# Patient Record
Sex: MALE | Race: WHITE | NOT HISPANIC OR LATINO | Employment: OTHER | URBAN - METROPOLITAN AREA
[De-identification: names, ages, dates, MRNs, and addresses within clinical notes are randomized per-mention and may not be internally consistent; named-entity substitution may affect disease eponyms.]

---

## 2021-04-26 ENCOUNTER — LAB (OUTPATIENT)
Dept: LAB | Facility: HOSPITAL | Age: 56
End: 2021-04-26

## 2021-04-26 ENCOUNTER — OFFICE VISIT (OUTPATIENT)
Dept: FAMILY MEDICINE CLINIC | Facility: CLINIC | Age: 56
End: 2021-04-26

## 2021-04-26 VITALS
OXYGEN SATURATION: 96 % | BODY MASS INDEX: 30.92 KG/M2 | TEMPERATURE: 96.8 F | WEIGHT: 204 LBS | SYSTOLIC BLOOD PRESSURE: 148 MMHG | DIASTOLIC BLOOD PRESSURE: 83 MMHG | HEART RATE: 97 BPM | HEIGHT: 68 IN

## 2021-04-26 DIAGNOSIS — R56.9 SEIZURES (HCC): Chronic | ICD-10-CM

## 2021-04-26 DIAGNOSIS — Z13.9 ENCOUNTER FOR HEALTH-RELATED SCREENING: ICD-10-CM

## 2021-04-26 DIAGNOSIS — J45.20 MILD INTERMITTENT ASTHMA WITHOUT COMPLICATION: Chronic | ICD-10-CM

## 2021-04-26 DIAGNOSIS — T78.40XA ALLERGIC DISORDER, INITIAL ENCOUNTER: Primary | Chronic | ICD-10-CM

## 2021-04-26 LAB
ANION GAP SERPL CALCULATED.3IONS-SCNC: 8.3 MMOL/L (ref 5–15)
BUN SERPL-MCNC: 11 MG/DL (ref 6–20)
BUN/CREAT SERPL: 10.7 (ref 7–25)
CALCIUM SPEC-SCNC: 9 MG/DL (ref 8.6–10.5)
CHLORIDE SERPL-SCNC: 105 MMOL/L (ref 98–107)
CHOLEST SERPL-MCNC: 193 MG/DL (ref 0–200)
CO2 SERPL-SCNC: 26.7 MMOL/L (ref 22–29)
CREAT SERPL-MCNC: 1.03 MG/DL (ref 0.76–1.27)
GFR SERPL CREATININE-BSD FRML MDRD: 75 ML/MIN/1.73
GLUCOSE SERPL-MCNC: 74 MG/DL (ref 65–99)
HCV AB SER DONR QL: NORMAL
HDLC SERPL-MCNC: 49 MG/DL (ref 40–60)
LDLC SERPL CALC-MCNC: 124 MG/DL (ref 0–100)
LDLC/HDLC SERPL: 2.47 {RATIO}
POTASSIUM SERPL-SCNC: 3.8 MMOL/L (ref 3.5–5.2)
SODIUM SERPL-SCNC: 140 MMOL/L (ref 136–145)
TRIGL SERPL-MCNC: 114 MG/DL (ref 0–150)
VLDLC SERPL-MCNC: 20 MG/DL (ref 5–40)

## 2021-04-26 PROCEDURE — 86803 HEPATITIS C AB TEST: CPT

## 2021-04-26 PROCEDURE — 80048 BASIC METABOLIC PNL TOTAL CA: CPT

## 2021-04-26 PROCEDURE — 99203 OFFICE O/P NEW LOW 30 MIN: CPT | Performed by: NURSE PRACTITIONER

## 2021-04-26 PROCEDURE — 36415 COLL VENOUS BLD VENIPUNCTURE: CPT

## 2021-04-26 PROCEDURE — 80061 LIPID PANEL: CPT

## 2021-04-26 RX ORDER — KETOTIFEN FUMARATE 0.35 MG/ML
1 SOLUTION/ DROPS OPHTHALMIC 2 TIMES DAILY
COMMUNITY

## 2021-04-26 RX ORDER — ALBUTEROL SULFATE 90 UG/1
2 AEROSOL, METERED RESPIRATORY (INHALATION) EVERY 4 HOURS PRN
COMMUNITY
Start: 2021-04-02 | End: 2022-09-27

## 2021-04-26 RX ORDER — PHENYTOIN SODIUM 100 MG/1
300 CAPSULE, EXTENDED RELEASE ORAL NIGHTLY
COMMUNITY
Start: 2021-04-02

## 2021-04-26 NOTE — PROGRESS NOTES
"Subjective        Faisal Cobb is a 55 y.o. male.     Chief Complaint   Patient presents with   • Seizures     new pt establishment       History of Present Illness  Patient is new to this office. He is here for management of his chronic medical problems seizures, allergies.     Health screening: he is unsure of covid vaccine. He is eating healthy and taking immune medications. He is current with flu shot.   He said he exercises.       The following portions of the patient's history were reviewed and updated as appropriate: allergies, current medications, past family history, past medical history, past social history, past surgical history and problem list.      Current Outpatient Medications:   •  albuterol sulfate  (90 Base) MCG/ACT inhaler, Inhale 2 puffs Every 4 (Four) Hours As Needed., Disp: , Rfl:   •  ketotifen (ZADITOR) 0.025 % ophthalmic solution, 1 drop 2 (Two) Times a Day., Disp: , Rfl:   •  LORATADINE ALLERGY RELIEF PO, Take 10 mg by mouth., Disp: , Rfl:   •  phenytoin (DILANTIN) 100 MG ER capsule, Take 300 mg by mouth Every Night., Disp: , Rfl:     No results found for this or any previous visit (from the past 4032 hour(s)).      Review of Systems   HENT:        Last dental exam one month ago Dr JESUS Mckee   Eyes:        1/2021 last eye exam.   Not color blind has bifocals.       Objective     /83 (BP Location: Right arm, Patient Position: Sitting, Cuff Size: Adult)   Pulse 97   Temp 96.8 °F (36 °C) (Infrared)   Ht 172.7 cm (68\")   Wt 92.5 kg (204 lb)   SpO2 96%   BMI 31.02 kg/m²     Physical Exam  Vitals and nursing note reviewed.   Constitutional:       Appearance: Normal appearance.   HENT:      Head: Normocephalic.      Right Ear: External ear normal.      Left Ear: External ear normal.      Nose: Nose normal.      Mouth/Throat:      Mouth: Mucous membranes are moist.   Eyes:      Conjunctiva/sclera: Conjunctivae normal.      Pupils: Pupils are equal, round, and reactive to light. "   Cardiovascular:      Rate and Rhythm: Normal rate and regular rhythm.      Pulses: Normal pulses.      Heart sounds: Normal heart sounds.   Pulmonary:      Effort: Pulmonary effort is normal.      Breath sounds: Normal breath sounds.   Abdominal:      General: Abdomen is flat. Bowel sounds are normal.      Palpations: Abdomen is soft.   Musculoskeletal:         General: Normal range of motion.      Cervical back: Normal range of motion.   Skin:     General: Skin is warm and dry.      Capillary Refill: Capillary refill takes less than 2 seconds.   Neurological:      General: No focal deficit present.      Mental Status: He is alert and oriented to person, place, and time.   Psychiatric:         Mood and Affect: Mood is elated.         Speech: Speech normal.         Behavior: Behavior is hyperactive.         Thought Content: Thought content normal.         Cognition and Memory: Cognition and memory normal.         Judgment: Judgment normal.         Result Review :                Assessment/Plan    Diagnoses and all orders for this visit:    1. Allergic disorder, initial encounter (Primary)  Comments:  discussed his allergies and using bronchodilator.   Orders:  -     Full Pulmonary Function Test With Bronchodilator; Future    2. Mild intermittent asthma without complication  Comments:  he reports never had a pulmonary functions study.  Orders:  -     Full Pulmonary Function Test With Bronchodilator; Future    3. Encounter for health-related screening  Comments:  labs ordered. talked with him about covid vaccine he declines  Orders:  -     Lipid Panel; Future  -     Basic Metabolic Panel; Future  -     Hepatitis C antibody; Future    4. Seizures (CMS/HCC)  Comments:  stable      Patient Instructions   Follow up after testing.   Continue current meds.       Follow Up   Return in about 6 months (around 10/26/2021).    Patient was given instructions and counseling regarding his condition or for health maintenance  advice. Please see specific information pulled into the AVS if appropriate.     Snow Coelho, APRN    04/26/21

## 2021-07-21 ENCOUNTER — TELEPHONE (OUTPATIENT)
Dept: FAMILY MEDICINE CLINIC | Facility: CLINIC | Age: 56
End: 2021-07-21

## 2021-07-21 RX ORDER — LORATADINE 10 MG/1
10 TABLET ORAL DAILY
Qty: 90 TABLET | Refills: 1 | Status: SHIPPED | OUTPATIENT
Start: 2021-07-21 | End: 2022-11-24 | Stop reason: SDUPTHER

## 2021-07-21 NOTE — TELEPHONE ENCOUNTER
Patient needs refill on Loratadine. He states when he was here in April he discussed getting amoxicillan for his allergies if they did not improve. He would like this rx called in. Please advise.

## 2021-07-22 RX ORDER — MONTELUKAST SODIUM 10 MG/1
10 TABLET ORAL NIGHTLY
Qty: 90 TABLET | Refills: 1 | Status: SHIPPED | OUTPATIENT
Start: 2021-07-22 | End: 2022-01-21

## 2021-10-26 ENCOUNTER — OFFICE VISIT (OUTPATIENT)
Dept: FAMILY MEDICINE CLINIC | Facility: CLINIC | Age: 56
End: 2021-10-26

## 2021-10-26 VITALS
SYSTOLIC BLOOD PRESSURE: 127 MMHG | BODY MASS INDEX: 30.31 KG/M2 | WEIGHT: 200 LBS | HEART RATE: 94 BPM | TEMPERATURE: 96.6 F | OXYGEN SATURATION: 94 % | DIASTOLIC BLOOD PRESSURE: 88 MMHG | HEIGHT: 68 IN

## 2021-10-26 DIAGNOSIS — Z23 NEED FOR VACCINATION: ICD-10-CM

## 2021-10-26 DIAGNOSIS — J45.20 MILD INTERMITTENT ASTHMA WITHOUT COMPLICATION: Primary | ICD-10-CM

## 2021-10-26 DIAGNOSIS — R56.9 SEIZURES (HCC): Chronic | ICD-10-CM

## 2021-10-26 PROCEDURE — 90732 PPSV23 VACC 2 YRS+ SUBQ/IM: CPT | Performed by: NURSE PRACTITIONER

## 2021-10-26 PROCEDURE — 99213 OFFICE O/P EST LOW 20 MIN: CPT | Performed by: NURSE PRACTITIONER

## 2021-10-26 PROCEDURE — G0009 ADMIN PNEUMOCOCCAL VACCINE: HCPCS | Performed by: NURSE PRACTITIONER

## 2021-10-26 RX ORDER — MULTIPLE VITAMINS W/ MINERALS TAB 9MG-400MCG
1 TAB ORAL DAILY
COMMUNITY

## 2021-10-26 NOTE — PATIENT INSTRUCTIONS
Follow up 6 months.  Get flu shot in couple of days  Get us the date that you got the covid vaccine.

## 2021-10-26 NOTE — PROGRESS NOTES
Subjective        Faisal Cobb Jr. is a 56 y.o. male.     Chief Complaint   Patient presents with   • Asthma     6 month f/u   • Seizures       History of Present Illness  Patient is here for management of his chronic medical problems; asthma and seizures.    Asthma: he is using albuterol inhaler every day. Has trouble breathing in the morning. Coffee helps says never tested for asthma.  Taking singulair daily 10 mg once day. He has humidifier in living room and bathroom. He said he cleans filters daily.    Seizures: dilantin 300 mg nightly denies he has had any seizures he is followed by neurologist Dr Wilcox. He has been evaluated of recent.    Health screening: he declines covid vaccine, but then said he got the J&J shot in July but washed his card and does not have proof. He got the vaccine a clinic. He will have to find paper on it. He will get flu shot. Will get the pneumonia vaccine today.    The following portions of the patient's history were reviewed and updated as appropriate: allergies, current medications, past family history, past medical history, past social history, past surgical history and problem list.      Current Outpatient Medications:   •  albuterol sulfate  (90 Base) MCG/ACT inhaler, Inhale 2 puffs Every 4 (Four) Hours As Needed., Disp: , Rfl:   •  ketotifen (ZADITOR) 0.025 % ophthalmic solution, 1 drop 2 (Two) Times a Day., Disp: , Rfl:   •  loratadine (Claritin) 10 MG tablet, Take 1 tablet by mouth Daily., Disp: 90 tablet, Rfl: 1  •  montelukast (Singulair) 10 MG tablet, Take 1 tablet by mouth Every Night., Disp: 90 tablet, Rfl: 1  •  multivitamin with minerals (MULTIVITAMIN ADULT PO), Take 1 tablet by mouth Daily., Disp: , Rfl:   •  phenytoin (DILANTIN) 100 MG ER capsule, Take 300 mg by mouth Every Night., Disp: , Rfl:     Current Facility-Administered Medications:   •  pneumococcal polysaccharide 23-valent (PNEUMOVAX-23) vaccine 0.5 mL, 0.5 mL, Intramuscular, Once, Snow Coelho,  "APRN    No results found for this or any previous visit (from the past 4032 hour(s)).      Review of Systems   HENT:        Recent dental fracture followed by dentist has a cavity will see dentist in 11/2021   Eyes:        Wears glasses  Last eye exam 2021  Not blind or colorblind   Allergic/Immunologic: Negative.    Neurological: Negative for seizures.        Occassional headaches  No seizures   Hematological: Negative.    Psychiatric/Behavioral: Negative for dysphoric mood. The patient is not nervous/anxious.        Objective     /88 (BP Location: Left arm, Patient Position: Sitting, Cuff Size: Adult)   Pulse 94   Temp 96.6 °F (35.9 °C) (Infrared)   Ht 172.7 cm (68\")   Wt 90.7 kg (200 lb)   SpO2 94%   BMI 30.41 kg/m²     Physical Exam  Vitals and nursing note reviewed.   Constitutional:       Appearance: Normal appearance.   HENT:      Head: Normocephalic.      Right Ear: External ear normal.      Left Ear: External ear normal.      Nose: Nose normal.      Mouth/Throat:      Mouth: Mucous membranes are moist.   Eyes:      Pupils: Pupils are equal, round, and reactive to light.   Cardiovascular:      Rate and Rhythm: Normal rate and regular rhythm.      Pulses: Normal pulses.      Heart sounds: Normal heart sounds.   Pulmonary:      Effort: Pulmonary effort is normal.      Breath sounds: Normal breath sounds.   Abdominal:      General: Abdomen is flat. Bowel sounds are normal.      Palpations: Abdomen is soft.   Musculoskeletal:         General: Normal range of motion.      Cervical back: Normal range of motion.   Skin:     General: Skin is warm and dry.      Capillary Refill: Capillary refill takes less than 2 seconds.   Neurological:      General: No focal deficit present.      Mental Status: He is oriented to person, place, and time.   Psychiatric:         Mood and Affect: Mood normal.         Behavior: Behavior normal.         Thought Content: Thought content normal.         Result Review :        "         Assessment/Plan    Diagnoses and all orders for this visit:    1. Mild intermittent asthma without complication (Primary)  Comments:  stable. will give pneumonia vaccine today  Orders:  -     pneumococcal polysaccharide 23-valent (PNEUMOVAX-23) vaccine 0.5 mL    2. Seizures (HCC)  Comments:  stable      Patient Instructions   Follow up 6 months.  Get flu shot in couple of days  Get us the date that you got the covid vaccine.        Follow Up   Return in about 6 months (around 4/26/2022) for Medicare Wellness.    Patient was given instructions and counseling regarding his condition or for health maintenance advice. Please see specific information pulled into the AVS if appropriate.     Snow Coelho, APRN    10/26/21

## 2022-01-21 RX ORDER — MONTELUKAST SODIUM 10 MG/1
TABLET ORAL
Qty: 90 TABLET | Refills: 1 | Status: SHIPPED | OUTPATIENT
Start: 2022-01-21 | End: 2022-07-18

## 2022-05-17 ENCOUNTER — OFFICE VISIT (OUTPATIENT)
Dept: FAMILY MEDICINE CLINIC | Facility: CLINIC | Age: 57
End: 2022-05-17

## 2022-05-17 VITALS
HEART RATE: 76 BPM | TEMPERATURE: 96.6 F | WEIGHT: 200 LBS | BODY MASS INDEX: 30.31 KG/M2 | DIASTOLIC BLOOD PRESSURE: 87 MMHG | OXYGEN SATURATION: 94 % | HEIGHT: 68 IN | SYSTOLIC BLOOD PRESSURE: 134 MMHG

## 2022-05-17 DIAGNOSIS — J45.20 MILD INTERMITTENT ASTHMA WITHOUT COMPLICATION: Chronic | ICD-10-CM

## 2022-05-17 DIAGNOSIS — R56.9 SEIZURES: Chronic | ICD-10-CM

## 2022-05-17 DIAGNOSIS — Z00.00 INITIAL MEDICARE ANNUAL WELLNESS VISIT: Primary | ICD-10-CM

## 2022-05-17 DIAGNOSIS — F06.8 MEMORY DISORDER DUE TO ORGANIC BRAIN DAMAGE: Chronic | ICD-10-CM

## 2022-05-17 DIAGNOSIS — T78.40XS ALLERGIC DISORDER, SEQUELA: ICD-10-CM

## 2022-05-17 PROCEDURE — 96160 PT-FOCUSED HLTH RISK ASSMT: CPT | Performed by: NURSE PRACTITIONER

## 2022-05-17 PROCEDURE — 1159F MED LIST DOCD IN RCRD: CPT | Performed by: NURSE PRACTITIONER

## 2022-05-17 PROCEDURE — G0438 PPPS, INITIAL VISIT: HCPCS | Performed by: NURSE PRACTITIONER

## 2022-05-17 PROCEDURE — 99213 OFFICE O/P EST LOW 20 MIN: CPT | Performed by: NURSE PRACTITIONER

## 2022-05-17 PROCEDURE — 1170F FXNL STATUS ASSESSED: CPT | Performed by: NURSE PRACTITIONER

## 2022-05-17 PROCEDURE — 1126F AMNT PAIN NOTED NONE PRSNT: CPT | Performed by: NURSE PRACTITIONER
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
## 2022-05-17 NOTE — PATIENT INSTRUCTIONS
Find someone to put on form for advanced care directive.   Follow up 6 months  Eat healthy and exercise.

## 2022-05-18 ENCOUNTER — LAB (OUTPATIENT)
Dept: LAB | Facility: HOSPITAL | Age: 57
End: 2022-05-18

## 2022-05-18 DIAGNOSIS — Z00.00 INITIAL MEDICARE ANNUAL WELLNESS VISIT: ICD-10-CM

## 2022-05-18 LAB
ALBUMIN SERPL-MCNC: 4.3 G/DL (ref 3.5–5.2)
ALBUMIN/GLOB SERPL: 1.7 G/DL
ALP SERPL-CCNC: 106 U/L (ref 39–117)
ALT SERPL W P-5'-P-CCNC: 22 U/L (ref 1–41)
ANION GAP SERPL CALCULATED.3IONS-SCNC: 9.2 MMOL/L (ref 5–15)
AST SERPL-CCNC: 19 U/L (ref 1–40)
BILIRUB SERPL-MCNC: 0.2 MG/DL (ref 0–1.2)
BUN SERPL-MCNC: 14 MG/DL (ref 6–20)
BUN/CREAT SERPL: 14.6 (ref 7–25)
CALCIUM SPEC-SCNC: 9.2 MG/DL (ref 8.6–10.5)
CHLORIDE SERPL-SCNC: 103 MMOL/L (ref 98–107)
CHOLEST SERPL-MCNC: 179 MG/DL (ref 0–200)
CO2 SERPL-SCNC: 25.8 MMOL/L (ref 22–29)
CREAT SERPL-MCNC: 0.96 MG/DL (ref 0.76–1.27)
EGFRCR SERPLBLD CKD-EPI 2021: 91.6 ML/MIN/1.73
GLOBULIN UR ELPH-MCNC: 2.6 GM/DL
GLUCOSE SERPL-MCNC: 71 MG/DL (ref 65–99)
HDLC SERPL-MCNC: 56 MG/DL (ref 40–60)
LDLC SERPL CALC-MCNC: 86 MG/DL (ref 0–100)
LDLC/HDLC SERPL: 1.4 {RATIO}
POTASSIUM SERPL-SCNC: 4.2 MMOL/L (ref 3.5–5.2)
PROT SERPL-MCNC: 6.9 G/DL (ref 6–8.5)
SODIUM SERPL-SCNC: 138 MMOL/L (ref 136–145)
TRIGL SERPL-MCNC: 224 MG/DL (ref 0–150)
VLDLC SERPL-MCNC: 37 MG/DL (ref 5–40)

## 2022-05-18 PROCEDURE — 80053 COMPREHEN METABOLIC PANEL: CPT

## 2022-05-18 PROCEDURE — 80061 LIPID PANEL: CPT

## 2022-05-18 PROCEDURE — 36415 COLL VENOUS BLD VENIPUNCTURE: CPT

## 2022-07-18 RX ORDER — MONTELUKAST SODIUM 10 MG/1
TABLET ORAL
Qty: 90 TABLET | Refills: 1 | Status: SHIPPED | OUTPATIENT
Start: 2022-07-18 | End: 2022-11-22 | Stop reason: SDUPTHER

## 2022-07-29 ENCOUNTER — TELEPHONE (OUTPATIENT)
Dept: FAMILY MEDICINE CLINIC | Facility: CLINIC | Age: 57
End: 2022-07-29

## 2022-07-29 NOTE — TELEPHONE ENCOUNTER
Caller: Faisal Cobb Jr.    Relationship: Self    Best call back number: [phone]    What is the best time to reach you: ANYTIME    Who are you requesting to speak with (clinical staff, provider,  specific staff member): PCP OR MEDICAL ASSISTANT    Do you know the name of the person who called: SELF    What was the call regarding: THE PATIENT WOULD LIKE TO KNOW IF THE OFFICE COULD ACCEPT HIS GIRLFRIEND, ALYSON KING, AS A NEW PATIENT. THE PATIENT DID NOT HAVE A RELIABLE FORM OF TRANSPORTATION PREVIOUSLY, BUT SHE NOW DOES.     THE PATIENT WOULD ALSO LIKE TO KNOW HIS MOST RECENT LAB RESULTS, IF POSSIBLE.     Do you require a callback: YES, PLEASE

## 2022-09-27 RX ORDER — ALBUTEROL SULFATE 90 UG/1
AEROSOL, METERED RESPIRATORY (INHALATION)
Qty: 18 G | Refills: 5 | Status: SHIPPED | OUTPATIENT
Start: 2022-09-27 | End: 2022-11-22 | Stop reason: SDUPTHER

## 2022-09-27 NOTE — TELEPHONE ENCOUNTER
Caller: Faisal Cobb Jr.    Relationship: Self    Best call back number: [phone]    Requested Prescriptions:   Requested Prescriptions     Pending Prescriptions Disp Refills   • albuterol sulfate  (90 Base) MCG/ACT inhaler [Pharmacy Med Name: albuterol sulfate HFA 90 mcg/actuation aerosol inhaler] 18 g 5     Sig: INHALE TWO PUFFS BY MOUTH EVERY 4 HOURS AS NEEDED        Pharmacy where request should be sent: [facility] - [place], [place] -[phone] [facility] [address] [place], [place] [id number] [place] [phone]      Additional details provided by patient: PATIENT IS OUT    Does the patient have less than a 3 day supply:  [x] Yes  [] No    Garland Barrios Rep   09/27/22 10:50 EDT

## 2022-11-22 ENCOUNTER — OFFICE VISIT (OUTPATIENT)
Dept: FAMILY MEDICINE CLINIC | Facility: CLINIC | Age: 57
End: 2022-11-22

## 2022-11-22 VITALS
TEMPERATURE: 96.4 F | DIASTOLIC BLOOD PRESSURE: 86 MMHG | WEIGHT: 191.6 LBS | BODY MASS INDEX: 29.04 KG/M2 | OXYGEN SATURATION: 96 % | HEIGHT: 68 IN | HEART RATE: 80 BPM | SYSTOLIC BLOOD PRESSURE: 121 MMHG | RESPIRATION RATE: 16 BRPM

## 2022-11-22 DIAGNOSIS — R56.9 SEIZURES: Primary | ICD-10-CM

## 2022-11-22 DIAGNOSIS — T78.40XS ALLERGIC DISORDER, SEQUELA: Chronic | ICD-10-CM

## 2022-11-22 DIAGNOSIS — J45.20 MILD INTERMITTENT ASTHMA WITHOUT COMPLICATION: Chronic | ICD-10-CM

## 2022-11-22 DIAGNOSIS — F06.8 MEMORY DISORDER DUE TO ORGANIC BRAIN DAMAGE: Chronic | ICD-10-CM

## 2022-11-22 PROCEDURE — 90677 PCV20 VACCINE IM: CPT | Performed by: NURSE PRACTITIONER

## 2022-11-22 PROCEDURE — 99214 OFFICE O/P EST MOD 30 MIN: CPT | Performed by: NURSE PRACTITIONER

## 2022-11-22 PROCEDURE — G0009 ADMIN PNEUMOCOCCAL VACCINE: HCPCS | Performed by: NURSE PRACTITIONER

## 2022-11-22 RX ORDER — MONTELUKAST SODIUM 10 MG/1
10 TABLET ORAL
Qty: 90 TABLET | Refills: 1 | Status: SHIPPED | OUTPATIENT
Start: 2022-11-22

## 2022-11-22 RX ORDER — MONTELUKAST SODIUM 10 MG/1
10 TABLET ORAL
Qty: 90 TABLET | Refills: 1 | Status: SHIPPED | OUTPATIENT
Start: 2022-11-22 | End: 2022-11-22 | Stop reason: SDUPTHER

## 2022-11-22 RX ORDER — ALBUTEROL SULFATE 90 UG/1
2 AEROSOL, METERED RESPIRATORY (INHALATION) EVERY 4 HOURS PRN
Qty: 18 G | Refills: 5 | Status: SHIPPED | OUTPATIENT
Start: 2022-11-22

## 2022-11-22 NOTE — PROGRESS NOTES
"Subjective        Faisal Cobb Jr. is a 57 y.o. male.     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 ,  Seizures, allergies, asthma.    Allergies: taking loratadine 10 mg daily ran out of singulair.     Asthma intermittent: using albuterol as needed has allergies . Ran out of singulair needs refill.    Memory disorder history of seizures. Stable on dilantin 300 mg nightly. Followed by Dr Wilcox. Neurology.reports he has his hands seize at night. He was told he has complex partial seizures           The following portions of the patient's history were reviewed and updated as appropriate: allergies, current medications, past family history, past medical history, past social history, past surgical history and problem list.      Current Outpatient Medications:   •  albuterol sulfate  (90 Base) MCG/ACT inhaler, Inhale 2 puffs Every 4 (Four) Hours As Needed for Wheezing., Disp: 18 g, Rfl: 5  •  ketotifen (ZADITOR) 0.025 % ophthalmic solution, 1 drop 2 (Two) Times a Day., Disp: , Rfl:   •  loratadine (Claritin) 10 MG tablet, Take 1 tablet by mouth Daily., Disp: 90 tablet, Rfl: 1  •  montelukast (SINGULAIR) 10 MG tablet, Take 1 tablet by mouth every night at bedtime., Disp: 90 tablet, Rfl: 1  •  multivitamin with minerals tablet tablet, Take 1 tablet by mouth Daily., Disp: , Rfl:   •  phenytoin (DILANTIN) 100 MG ER capsule, Take 300 mg by mouth Every Night., Disp: , Rfl:     No results found for this or any previous visit (from the past 4032 hour(s)).      Review of Systems    Objective     /86   Pulse 80   Temp 96.4 °F (35.8 °C) (Infrared)   Resp 16   Ht 172.7 cm (68\")   Wt 86.9 kg (191 lb 9.6 oz)   SpO2 96%   BMI 29.13 kg/m²     Physical Exam  Vitals and nursing note reviewed. "   Constitutional:       Appearance: Normal appearance.   HENT:      Right Ear: Tympanic membrane and external ear normal.      Left Ear: Tympanic membrane and external ear normal.      Nose: Nose normal.      Mouth/Throat:      Mouth: Mucous membranes are moist.   Eyes:      Pupils: Pupils are equal, round, and reactive to light.   Cardiovascular:      Rate and Rhythm: Normal rate and regular rhythm.      Pulses: Normal pulses.      Heart sounds: Normal heart sounds.   Pulmonary:      Effort: Pulmonary effort is normal.      Breath sounds: Normal breath sounds.   Abdominal:      General: Bowel sounds are normal.      Palpations: Abdomen is soft.   Musculoskeletal:         General: Normal range of motion.      Cervical back: Neck supple.   Skin:     General: Skin is warm and dry.      Capillary Refill: Capillary refill takes less than 2 seconds.   Neurological:      General: No focal deficit present.      Mental Status: He is alert and oriented to person, place, and time.   Psychiatric:         Mood and Affect: Mood normal.         Behavior: Behavior normal.         Thought Content: Thought content normal.         Judgment: Judgment normal.         Result Review :                Assessment & Plan    Diagnoses and all orders for this visit:    1. Seizures (HCC) (Primary)  Comments:  stable followed by Dr Wilcox    2. Allergic disorder, sequela  Comments:  stable    3. 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 Mild intermittent asthma without complication  Comments:  refilled his singulair    Other orders  -     Pneumococcal Conjugate Vaccine 20-Valent (PCV20)  -     Discontinue: montelukast (SINGULAIR) 10 MG tablet; Take 1 tablet by mouth every night at bedtime.  Dispense: 90 tablet; Refill: 1  -     albuterol sulfate  (90 Base) MCG/ACT inhaler; Inhale 2 puffs Every 4 (Four) Hours As Needed for Wheezing.  Dispense: 18 g; Refill: 5  -     montelukast (SINGULAIR) 10 MG tablet; Take 1  tablet by mouth every night at bedtime.  Dispense: 90 tablet; Refill: 1      Patient Instructions   Use albuterol as needed      Follow Up   Return in about 6 months (around 5/22/2023).    Patient was given instructions and counseling regarding his condition or for health maintenance advice. Please see specific information pulled into the AVS if appropriate.     Snow Coelho, APRN    11/22/22

## 2022-11-23 ENCOUNTER — TELEPHONE (OUTPATIENT)
Dept: FAMILY MEDICINE CLINIC | Facility: CLINIC | Age: 57
End: 2022-11-23

## 2022-11-24 RX ORDER — LORATADINE 10 MG/1
10 TABLET ORAL DAILY
Qty: 90 TABLET | Refills: 1 | Status: SHIPPED | OUTPATIENT
Start: 2022-11-24

## 2022-11-24 RX ORDER — LORATADINE 10 MG/1
TABLET ORAL
Qty: 90 TABLET | Refills: 1 | Status: SHIPPED | OUTPATIENT
Start: 2022-11-24

## 2022-12-09 ENCOUNTER — OFFICE VISIT (OUTPATIENT)
Dept: FAMILY MEDICINE CLINIC | Facility: CLINIC | Age: 57
End: 2022-12-09

## 2022-12-09 VITALS
HEART RATE: 93 BPM | SYSTOLIC BLOOD PRESSURE: 133 MMHG | DIASTOLIC BLOOD PRESSURE: 87 MMHG | OXYGEN SATURATION: 98 % | WEIGHT: 189 LBS | BODY MASS INDEX: 28.64 KG/M2 | HEIGHT: 68 IN | TEMPERATURE: 98.2 F

## 2022-12-09 DIAGNOSIS — Z87.828 HISTORY OF OPEN HAND WOUND: Primary | ICD-10-CM

## 2022-12-09 PROCEDURE — 99212 OFFICE O/P EST SF 10 MIN: CPT | Performed by: NURSE PRACTITIONER

## 2022-12-09 NOTE — PROGRESS NOTES
"Subjective        Faisal Cobb Jr. is a 57 y.o. male.     Chief Complaint   Patient presents with   • Laceration     \"Stick went through hand\"       History of Present Illness  Wed am he was working outside and stick went thru his hand left thenar region.  He is cleaning it and using neosporin . His last tetanus shot was 2018.   He is not having pain. No redness no swelling.      The following portions of the patient's history were reviewed and updated as appropriate: allergies, current medications, past family history, past medical history, past social history, past surgical history and problem list.      Current Outpatient Medications:   •  albuterol sulfate  (90 Base) MCG/ACT inhaler, Inhale 2 puffs Every 4 (Four) Hours As Needed for Wheezing., Disp: 18 g, Rfl: 5  •  ketotifen (ZADITOR) 0.025 % ophthalmic solution, 1 drop 2 (Two) Times a Day., Disp: , Rfl:   •  loratadine (Claritin) 10 MG tablet, Take 1 tablet by mouth Daily., Disp: 90 tablet, Rfl: 1  •  montelukast (SINGULAIR) 10 MG tablet, Take 1 tablet by mouth every night at bedtime., Disp: 90 tablet, Rfl: 1  •  multivitamin with minerals tablet tablet, Take 1 tablet by mouth Daily., Disp: , Rfl:   •  phenytoin (DILANTIN) 100 MG ER capsule, Take 300 mg by mouth Every Night., Disp: , Rfl:   •  Allergy Relief 10 MG tablet, TAKE ONE TABLET BY MOUTH EVERY DAY, Disp: 90 tablet, Rfl: 1    No results found for this or any previous visit (from the past 4032 hour(s)).      Review of Systems    Objective     /87 (BP Location: Left arm, Patient Position: Sitting, Cuff Size: Adult)   Pulse 93   Temp 98.2 °F (36.8 °C) (Infrared)   Ht 172.7 cm (68\")   Wt 85.7 kg (189 lb)   SpO2 98%   BMI 28.74 kg/m²     Physical Exam  Skin:            Comments: Incised wound left hand thenar region no redness no swelling. Is clean and dry.          Result Review :                Assessment & Plan    Diagnoses and all orders for this visit:    1. History of open hand wound " (Primary)  Comments:  clean and monitor for signs of infection.      Patient Instructions   If area becomes more painful red, drainage get seen.       Follow Up   No follow-ups on file.    Patient was given instructions and counseling regarding his condition or for health maintenance advice. Please see specific information pulled into the AVS if appropriate.     Snow Coelho, APRN    12/09/22

## 2023-07-21 ENCOUNTER — LAB (OUTPATIENT)
Dept: LAB | Facility: HOSPITAL | Age: 58
End: 2023-07-21
Payer: MEDICARE

## 2023-07-21 DIAGNOSIS — Z00.00 MEDICARE ANNUAL WELLNESS VISIT, SUBSEQUENT: ICD-10-CM

## 2023-07-21 PROBLEM — I49.8 FLUTTERING HEART: Status: ACTIVE | Noted: 2023-07-21

## 2023-07-21 PROBLEM — Z87.828 HISTORY OF OPEN HAND WOUND: Status: RESOLVED | Noted: 2022-12-09 | Resolved: 2023-07-21

## 2023-07-21 LAB
ALBUMIN SERPL-MCNC: 4.6 G/DL (ref 3.5–5.2)
ALBUMIN/GLOB SERPL: 1.8 G/DL
ALP SERPL-CCNC: 128 U/L (ref 39–117)
ALT SERPL W P-5'-P-CCNC: 15 U/L (ref 1–41)
ANION GAP SERPL CALCULATED.3IONS-SCNC: 11.8 MMOL/L (ref 5–15)
AST SERPL-CCNC: 17 U/L (ref 1–40)
BILIRUB SERPL-MCNC: 0.3 MG/DL (ref 0–1.2)
BUN SERPL-MCNC: 18 MG/DL (ref 6–20)
BUN/CREAT SERPL: 14.9 (ref 7–25)
CALCIUM SPEC-SCNC: 9.5 MG/DL (ref 8.6–10.5)
CHLORIDE SERPL-SCNC: 105 MMOL/L (ref 98–107)
CHOLEST SERPL-MCNC: 173 MG/DL (ref 0–200)
CO2 SERPL-SCNC: 25.2 MMOL/L (ref 22–29)
CREAT SERPL-MCNC: 1.21 MG/DL (ref 0.76–1.27)
EGFRCR SERPLBLD CKD-EPI 2021: 69 ML/MIN/1.73
GLOBULIN UR ELPH-MCNC: 2.5 GM/DL
GLUCOSE SERPL-MCNC: 72 MG/DL (ref 65–99)
HDLC SERPL-MCNC: 50 MG/DL (ref 40–60)
LDLC SERPL CALC-MCNC: 84 MG/DL (ref 0–100)
LDLC/HDLC SERPL: 1.52 {RATIO}
POTASSIUM SERPL-SCNC: 4 MMOL/L (ref 3.5–5.2)
PROT SERPL-MCNC: 7.1 G/DL (ref 6–8.5)
SODIUM SERPL-SCNC: 142 MMOL/L (ref 136–145)
TRIGL SERPL-MCNC: 235 MG/DL (ref 0–150)
VLDLC SERPL-MCNC: 39 MG/DL (ref 5–40)

## 2023-07-21 PROCEDURE — 80061 LIPID PANEL: CPT

## 2023-07-21 PROCEDURE — 80053 COMPREHEN METABOLIC PANEL: CPT

## 2023-07-21 PROCEDURE — 36415 COLL VENOUS BLD VENIPUNCTURE: CPT

## 2023-08-24 ENCOUNTER — OFFICE VISIT (OUTPATIENT)
Dept: CARDIOLOGY | Facility: CLINIC | Age: 58
End: 2023-08-24
Payer: MEDICARE

## 2023-08-24 VITALS
HEIGHT: 68 IN | WEIGHT: 197 LBS | HEART RATE: 69 BPM | BODY MASS INDEX: 29.86 KG/M2 | SYSTOLIC BLOOD PRESSURE: 135 MMHG | RESPIRATION RATE: 18 BRPM | DIASTOLIC BLOOD PRESSURE: 91 MMHG

## 2023-08-24 DIAGNOSIS — R00.2 PALPITATIONS: Primary | ICD-10-CM

## 2023-08-24 PROCEDURE — 99204 OFFICE O/P NEW MOD 45 MIN: CPT | Performed by: INTERNAL MEDICINE

## 2023-08-24 PROCEDURE — 93000 ELECTROCARDIOGRAM COMPLETE: CPT | Performed by: INTERNAL MEDICINE

## 2023-08-25 ENCOUNTER — TELEPHONE (OUTPATIENT)
Dept: CARDIOLOGY | Facility: CLINIC | Age: 58
End: 2023-08-25

## 2023-08-25 NOTE — TELEPHONE ENCOUNTER
Pt was calling stating having problems with the phone of the [facility] not keeping charge.  Advised pt to call company.  Pt agrees.

## 2023-08-25 NOTE — TELEPHONE ENCOUNTER
[facility] staff attempted to follow warm transfer process and was unsuccessful     Caller: Faisal Cobb Jr.    Relationship to patient: Self    Best call back number: [phone]    Patient is needing: PT STATES HE WAS SPEAKING WITH SOMEONE FROM OFFICE ABOUT HIS HOLTER MONITOR (DOESN'T REMEMBER HER NAME) AND WANTS TO SPEAK BACK WITH HER TO DISCUSS THINGS FURTHER. NOT MANY DETAILS GIVING. PLEASE FOLLOW UP.

## 2023-10-10 ENCOUNTER — OFFICE VISIT (OUTPATIENT)
Dept: CARDIOLOGY | Facility: CLINIC | Age: 58
End: 2023-10-10
Payer: MEDICARE

## 2023-10-10 VITALS
HEIGHT: 68 IN | DIASTOLIC BLOOD PRESSURE: 90 MMHG | WEIGHT: 201 LBS | BODY MASS INDEX: 30.46 KG/M2 | SYSTOLIC BLOOD PRESSURE: 128 MMHG | RESPIRATION RATE: 18 BRPM | HEART RATE: 76 BPM

## 2023-10-10 DIAGNOSIS — R00.2 PALPITATIONS: Primary | ICD-10-CM

## 2023-10-10 PROCEDURE — 1160F RVW MEDS BY RX/DR IN RCRD: CPT | Performed by: INTERNAL MEDICINE

## 2023-10-10 PROCEDURE — 99213 OFFICE O/P EST LOW 20 MIN: CPT | Performed by: INTERNAL MEDICINE

## 2023-10-10 PROCEDURE — 1159F MED LIST DOCD IN RCRD: CPT | Performed by: INTERNAL MEDICINE

## 2023-11-03 RX ORDER — ALBUTEROL SULFATE 90 UG/1
2 AEROSOL, METERED RESPIRATORY (INHALATION) EVERY 4 HOURS PRN
Qty: 18 G | Refills: 5 | Status: SHIPPED | OUTPATIENT
Start: 2023-11-03

## 2024-01-22 ENCOUNTER — OFFICE VISIT (OUTPATIENT)
Dept: FAMILY MEDICINE CLINIC | Facility: CLINIC | Age: 59
End: 2024-01-22
Payer: MEDICARE

## 2024-01-22 VITALS
DIASTOLIC BLOOD PRESSURE: 87 MMHG | WEIGHT: 201 LBS | HEIGHT: 68 IN | TEMPERATURE: 98.2 F | OXYGEN SATURATION: 98 % | SYSTOLIC BLOOD PRESSURE: 146 MMHG | HEART RATE: 83 BPM | BODY MASS INDEX: 30.46 KG/M2

## 2024-01-22 DIAGNOSIS — F06.8 MEMORY DISORDER DUE TO ORGANIC BRAIN DAMAGE: Chronic | ICD-10-CM

## 2024-01-22 DIAGNOSIS — R56.9 SEIZURES: Chronic | ICD-10-CM

## 2024-01-22 DIAGNOSIS — T78.40XS ALLERGIC DISORDER, SEQUELA: Chronic | ICD-10-CM

## 2024-01-22 DIAGNOSIS — J45.20 MILD INTERMITTENT ASTHMA WITHOUT COMPLICATION: Primary | Chronic | ICD-10-CM

## 2024-01-22 DIAGNOSIS — E66.09 CLASS 1 OBESITY DUE TO EXCESS CALORIES WITHOUT SERIOUS COMORBIDITY WITH BODY MASS INDEX (BMI) OF 30.0 TO 30.9 IN ADULT: Chronic | ICD-10-CM

## 2024-01-22 PROBLEM — E66.811 CLASS 1 OBESITY DUE TO EXCESS CALORIES WITHOUT SERIOUS COMORBIDITY WITH BODY MASS INDEX (BMI) OF 30.0 TO 30.9 IN ADULT: Status: ACTIVE | Noted: 2024-01-22

## 2024-01-22 PROCEDURE — 1160F RVW MEDS BY RX/DR IN RCRD: CPT | Performed by: NURSE PRACTITIONER

## 2024-01-22 PROCEDURE — 1159F MED LIST DOCD IN RCRD: CPT | Performed by: NURSE PRACTITIONER

## 2024-01-22 PROCEDURE — 99214 OFFICE O/P EST MOD 30 MIN: CPT | Performed by: NURSE PRACTITIONER

## 2024-01-22 NOTE — PROGRESS NOTES
"Subjective        Faisal Cobb Jr. is a 58 y.o. male.     Chief Complaint   Patient presents with    Asthma     6 month f/u       Asthma  His past medical history is significant for asthma.     Patient is here for management of his chronic medical problems: asthma seizures.    Asthma he is using albuterol as needed.  Has allergies taking singulair 10 mg daily.     Seizures: dilantin 100mg ER taking 300 mg nightly. 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     Allergies: taking allergy relief 10 mg daily. Singulair daily.         The following portions of the patient's history were reviewed and updated as appropriate: allergies, current medications, past family history, past medical history, past social history, past surgical history and problem list.      Current Outpatient Medications:     albuterol sulfate  (90 Base) MCG/ACT inhaler, INHALE TWO PUFFS BY MOUTH EVERY 4 HOURS AS NEEDED, Disp: 18 g, Rfl: 5    Allergy Relief 10 MG tablet, TAKE ONE TABLET BY MOUTH EVERY DAY, Disp: 90 tablet, Rfl: 1    loratadine (Claritin) 10 MG tablet, Take 1 tablet by mouth Daily., Disp: 90 tablet, Rfl: 1    montelukast (SINGULAIR) 10 MG tablet, Take 1 tablet by mouth every night at bedtime., Disp: 90 tablet, Rfl: 1    multivitamin with minerals tablet tablet, Take 1 tablet by mouth Daily., Disp: , Rfl:     phenytoin (DILANTIN) 100 MG ER capsule, Take 3 capsules by mouth Every Night., Disp: , Rfl:     No results found for this or any previous visit (from the past 4032 hour(s)).      Review of Systems    Objective     /87   Pulse 83   Temp 98.2 °F (36.8 °C) (Infrared)   Ht 172.7 cm (68\")   Wt 91.2 kg (201 lb)   SpO2 98%   BMI 30.56 kg/m²     Physical Exam  Vitals and nursing note reviewed.   Constitutional:       Appearance: Normal appearance.   HENT:      Head: Normocephalic.      Right Ear: Tympanic membrane normal.      Left Ear: Tympanic membrane normal.      Nose: Nose normal.      Mouth/Throat:      " Mouth: Mucous membranes are moist.   Eyes:      Conjunctiva/sclera: Conjunctivae normal.      Pupils: Pupils are equal, round, and reactive to light.   Cardiovascular:      Rate and Rhythm: Normal rate and regular rhythm.      Pulses: Normal pulses.      Heart sounds: Normal heart sounds.   Pulmonary:      Effort: Pulmonary effort is normal.      Breath sounds: Normal breath sounds.   Abdominal:      General: Bowel sounds are normal.      Palpations: Abdomen is soft.   Skin:     General: Skin is warm.   Neurological:      Mental Status: He is alert and oriented to person, place, and time. Mental status is at baseline.   Psychiatric:         Mood and Affect: Mood normal.         Thought Content: Thought content normal.         Result Review :                Assessment & Plan    Diagnoses and all orders for this visit:    1. Mild intermittent asthma without complication (Primary)  Comments:  stable    2. Seizures  Comments:  stable    3. 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 Allergic disorder, sequela  Comments:  stable    5. Class 1 obesity due to excess calories without serious comorbidity with body mass index (BMI) of 30.0 to 30.9 in adult  Comments:  we talked about walking daily  and eating healthy  Assessment & Plan:  Patient's (Body mass index is 30.56 kg/m².) indicates that they are obese (BMI >30) with health conditions that include  seizure disorder  . Weight is unchanged. BMI  is above average; BMI management plan is completed. We discussed portion control and increasing exercise.         Patient Instructions   Eat healthy exercise daily.  Use inhaler if needed.      Follow Up   Return in about 6 months (around 7/22/2024).    Patient was given instructions and counseling regarding his condition or for health maintenance advice. Please see specific information pulled into the AVS if appropriate.     Snow Coelho, APRN    01/22/24

## 2024-01-22 NOTE — ASSESSMENT & PLAN NOTE
Patient's (Body mass index is 30.56 kg/m².) indicates that they are obese (BMI >30) with health conditions that include  seizure disorder  . Weight is unchanged. BMI  is above average; BMI management plan is completed. We discussed portion control and increasing exercise.

## 2024-02-22 ENCOUNTER — TELEPHONE (OUTPATIENT)
Dept: FAMILY MEDICINE CLINIC | Facility: CLINIC | Age: 59
End: 2024-02-22
Payer: MEDICARE

## 2024-02-22 RX ORDER — MONTELUKAST SODIUM 10 MG/1
10 TABLET ORAL
Qty: 90 TABLET | Refills: 1 | Status: SHIPPED | OUTPATIENT
Start: 2024-02-22

## 2024-02-22 RX ORDER — LORATADINE 10 MG/1
10 TABLET ORAL DAILY
Qty: 90 TABLET | Refills: 1 | Status: SHIPPED | OUTPATIENT
Start: 2024-02-22

## 2024-02-22 NOTE — TELEPHONE ENCOUNTER
Patient needs Montelukast, Loratadine, and allergy relief medications sent to [facility]. No longer uses [facility].

## 2024-08-02 ENCOUNTER — TELEPHONE (OUTPATIENT)
Dept: FAMILY MEDICINE CLINIC | Facility: CLINIC | Age: 59
End: 2024-08-02
Payer: MEDICARE

## 2024-08-02 RX ORDER — ALBUTEROL SULFATE 90 UG/1
2 AEROSOL, METERED RESPIRATORY (INHALATION) EVERY 4 HOURS PRN
Qty: 18 G | Refills: 5 | Status: SHIPPED | OUTPATIENT
Start: 2024-08-02

## 2024-08-12 ENCOUNTER — LAB (OUTPATIENT)
Dept: LAB | Facility: HOSPITAL | Age: 59
End: 2024-08-12
Payer: MEDICARE

## 2024-08-12 ENCOUNTER — OFFICE VISIT (OUTPATIENT)
Dept: FAMILY MEDICINE CLINIC | Facility: CLINIC | Age: 59
End: 2024-08-12
Payer: MEDICAID

## 2024-08-12 VITALS
HEIGHT: 68 IN | TEMPERATURE: 97.6 F | OXYGEN SATURATION: 96 % | WEIGHT: 214 LBS | DIASTOLIC BLOOD PRESSURE: 86 MMHG | BODY MASS INDEX: 32.43 KG/M2 | SYSTOLIC BLOOD PRESSURE: 117 MMHG | RESPIRATION RATE: 18 BRPM | HEART RATE: 76 BPM

## 2024-08-12 DIAGNOSIS — Z00.00 MEDICARE ANNUAL WELLNESS VISIT, SUBSEQUENT: ICD-10-CM

## 2024-08-12 DIAGNOSIS — Z12.5 PROSTATE CANCER SCREENING: Chronic | ICD-10-CM

## 2024-08-12 DIAGNOSIS — Z00.00 MEDICARE ANNUAL WELLNESS VISIT, SUBSEQUENT: Primary | ICD-10-CM

## 2024-08-12 DIAGNOSIS — T78.40XS ALLERGIC DISORDER, SEQUELA: Chronic | ICD-10-CM

## 2024-08-12 DIAGNOSIS — R56.9 SEIZURES: Chronic | ICD-10-CM

## 2024-08-12 DIAGNOSIS — J45.20 MILD INTERMITTENT ASTHMA WITHOUT COMPLICATION: Chronic | ICD-10-CM

## 2024-08-12 LAB
ALBUMIN SERPL-MCNC: 4.3 G/DL (ref 3.5–5.2)
ALBUMIN/GLOB SERPL: 1.6 G/DL
ALP SERPL-CCNC: 115 U/L (ref 39–117)
ALT SERPL W P-5'-P-CCNC: 17 U/L (ref 1–41)
ANION GAP SERPL CALCULATED.3IONS-SCNC: 9 MMOL/L (ref 5–15)
AST SERPL-CCNC: 18 U/L (ref 1–40)
BILIRUB SERPL-MCNC: 0.3 MG/DL (ref 0–1.2)
BUN SERPL-MCNC: 10 MG/DL (ref 8–23)
BUN/CREAT SERPL: 9.6 (ref 7–25)
CALCIUM SPEC-SCNC: 9.1 MG/DL (ref 8.6–10.5)
CHLORIDE SERPL-SCNC: 103 MMOL/L (ref 98–107)
CHOLEST SERPL-MCNC: 190 MG/DL (ref 0–200)
CO2 SERPL-SCNC: 25 MMOL/L (ref 22–29)
CREAT SERPL-MCNC: 1.04 MG/DL (ref 0.76–1.27)
EGFRCR SERPLBLD CKD-EPI 2021: 82.2 ML/MIN/1.73
GLOBULIN UR ELPH-MCNC: 2.7 GM/DL
GLUCOSE SERPL-MCNC: 87 MG/DL (ref 65–99)
HDLC SERPL-MCNC: 55 MG/DL (ref 40–60)
LDLC SERPL CALC-MCNC: 98 MG/DL (ref 0–100)
LDLC/HDLC SERPL: 1.66 {RATIO}
POTASSIUM SERPL-SCNC: 4.1 MMOL/L (ref 3.5–5.2)
PROT SERPL-MCNC: 7 G/DL (ref 6–8.5)
PSA SERPL-MCNC: 1.2 NG/ML (ref 0–4)
SODIUM SERPL-SCNC: 137 MMOL/L (ref 136–145)
TRIGL SERPL-MCNC: 218 MG/DL (ref 0–150)
VLDLC SERPL-MCNC: 37 MG/DL (ref 5–40)

## 2024-08-12 PROCEDURE — 36415 COLL VENOUS BLD VENIPUNCTURE: CPT

## 2024-08-12 PROCEDURE — 80061 LIPID PANEL: CPT

## 2024-08-12 PROCEDURE — G0103 PSA SCREENING: HCPCS

## 2024-08-12 PROCEDURE — 80053 COMPREHEN METABOLIC PANEL: CPT

## 2024-08-12 NOTE — PROGRESS NOTES
Subjective   The ABCs of the Annual Wellness Visit  Medicare Wellness Visit      Faisal Cobb Jr. is a 59 y.o. patient who presents for a Medicare Wellness Visit.    The following portions of the patient's history were reviewed and   updated as appropriate: allergies, current medications, past family history, past medical history, past social history, past surgical history, and problem list.    Compared to one year ago, the patient's physical   health is the same.  Compared to one year ago, the patient's mental   health is the same.    Recent Hospitalizations:  He was not admitted to the hospital during the last year.     Current Medical Providers:  Patient Care Team:  Snow Coelho APRN as PCP - General (Nurse Practitioner)  Steven Wilcox MD as Consulting Physician (Neurology)  Princess Jj MD as Consulting Physician (Cardiology)  Waldo Haas MD as Consulting Physician (Ophthalmology)    Outpatient Medications Prior to Visit   Medication Sig Dispense Refill    albuterol sulfate  (90 Base) MCG/ACT inhaler Inhale 2 puffs Every 4 (Four) Hours As Needed for Wheezing. 18 g 5    loratadine (Claritin) 10 MG tablet Take 1 tablet by mouth Daily. 90 tablet 1    montelukast (SINGULAIR) 10 MG tablet Take 1 tablet by mouth every night at bedtime. 90 tablet 1    multivitamin with minerals tablet tablet Take 1 tablet by mouth Daily.      phenytoin (DILANTIN) 100 MG ER capsule Take 3 capsules by mouth Every Night.       No facility-administered medications prior to visit.     No opioid medication identified on active medication list. I have reviewed chart for other potential  high risk medication/s and harmful drug interactions in the elderly.      Aspirin is not on active medication list.  Aspirin use is not indicated based on review of current medical condition/s. Risk of harm outweighs potential benefits.  .    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  ACP discussion was held with the patient during this visit. Patient does not have an advance directive, information provided.            Objective   Vitals:    24 1102 24 1200   BP: 138/97 117/86   BP Location: Left arm    Patient Position: Sitting    Cuff Size: Adult    Pulse: 76    Resp: 18    Temp: 97.6 °F (36.4 °C)    TempSrc: Oral    SpO2: 96%    Weight: 97.1 kg (214 lb)    Height: 172.7 cm (68\")        Estimated body mass index is 32.54 kg/m² as calculated from the following:    Height as of this encounter: 172.7 cm (68\").    Weight as of this encounter: 97.1 kg (214 lb).            Does the patient have evidence of cognitive impairment? No                                                                                                Health  Risk Assessment    Smoking Status:  Social History     Tobacco Use   Smoking Status Never   Smokeless Tobacco Never     Alcohol Consumption:  Social History     Substance and Sexual Activity   Alcohol Use Never       Fall Risk Screen  STEADI Fall Risk Assessment was completed, and patient is at MODERATE risk for falls. Assessment completed on:2024    Depression Screenin/12/2024    11:00 AM   PHQ-2/PHQ-9 Depression Screening   Little Interest or Pleasure in Doing Things 0-->not at all   Feeling Down, Depressed or Hopeless 0-->not at all   PHQ-9: Brief Depression Severity Measure Score 0     Health Habits and Functional and Cognitive Screenin/12/2024    11:00 AM   Functional & Cognitive Status   Do you have difficulty preparing food and eating? No   Do you have difficulty bathing yourself, getting dressed or grooming yourself? No "   Do you have difficulty using the toilet? No   Do you have difficulty moving around from place to place? No   Do you have trouble with steps or getting out of a bed or a chair? No   Current Diet Well Balanced Diet   Dental Exam Not up to date   Eye Exam Up to date   Exercise (times per week) 7 times per week   Current Exercises Include Walking   Do you need help using the phone?  No   Are you deaf or do you have serious difficulty hearing?  No   Do you need help to go to places out of walking distance? No   Do you need help shopping? No   Do you need help preparing meals?  No   Do you need help with housework?  No   Do you need help with laundry? No   Do you need help taking your medications? No   Do you need help managing money? No   Do you ever drive or ride in a car without wearing a seat belt? No   Have you felt unusual stress, anger or loneliness in the last month? No   Who do you live with? Alone   If you need help, do you have trouble finding someone available to you? No   Have you been bothered in the last four weeks by sexual problems? No   Do you have difficulty concentrating, remembering or making decisions? No           Age-appropriate Screening Schedule:  Refer to the list below for future screening recommendations based on patient's age, sex and/or medical conditions. Orders for these recommended tests are listed in the plan section. The patient has been provided with a written plan.    Health Maintenance List  Health Maintenance   Topic Date Due    INFLUENZA VACCINE  08/01/2024    COVID-19 Vaccine (1 - 2023-24 season) 08/14/2024 (Originally 9/1/2023)    ZOSTER VACCINE (1 of 2) 07/21/2033 (Originally 5/11/2014)    BMI FOLLOWUP  01/22/2025    ANNUAL WELLNESS VISIT  08/12/2025    TDAP/TD VACCINES (2 - Td or Tdap) 03/30/2028    HEPATITIS C SCREENING  Completed    Pneumococcal Vaccine 0-64  Completed    COLORECTAL CANCER SCREENING  Discontinued                                                                "                                                                                 CMS Preventative Services Quick Reference  Risk Factors Identified During Encounter  None Identified    The above risks/problems have been discussed with the patient.  Pertinent information has been shared with the patient in the After Visit Summary.  An After Visit Summary and PPPS were made available to the patient.    Follow Up:   Next Medicare Wellness visit to be scheduled in 1 year.         Additional E&M Note during same encounter follows:  Patient has additional, significant, and separately identifiable condition(s)/problem(s) that require work above and beyond the Medicare Wellness Visit     Chief Complaint  Medicare Wellness-subsequent    Patient is here for management of his chronic medical problems:  Seizure disorder, mild asthma, allergies,     Seizure d/o- taking dilantin 100mg ER taking 300 mg nightly. Reports followed by neurology told he has complex partial seizures.     Asthma using albuterol as needed and singulair 10 mg daily, loratadine daily.       Asthma stable with albuterol as needed. Triggers asthma. Using Singulair daily. Has loratadine daily.           Subjective   HPI      Review of Systems   HENT:  Negative for postnasal drip.         He is followed by dentist.   Has few teeth need fixed he states.    Eyes:         Glasses eye exam 2/2024 no other changes   Gastrointestinal:  Negative for abdominal pain and constipation.   Genitourinary: Negative.    Skin: Negative.    Allergic/Immunologic: Negative.    Neurological:  Positive for seizures.        Couple shaking seizures he reports had medication changes   Hematological: Negative.               Objective   Vital Signs:  /86   Pulse 76   Temp 97.6 °F (36.4 °C) (Oral)   Resp 18   Ht 172.7 cm (68\")   Wt 97.1 kg (214 lb)   SpO2 96%   BMI 32.54 kg/m²   Physical Exam  Vitals and nursing note reviewed.   Constitutional:       General: He is not in " acute distress.     Appearance: Normal appearance.   HENT:      Head: Normocephalic.      Right Ear: Tympanic membrane normal.      Left Ear: Tympanic membrane normal.      Nose: Nose normal.      Mouth/Throat:      Mouth: Mucous membranes are moist.   Eyes:      Pupils: Pupils are equal, round, and reactive to light.   Cardiovascular:      Rate and Rhythm: Normal rate and regular rhythm.      Pulses: Normal pulses.      Heart sounds: Normal heart sounds.   Pulmonary:      Effort: Pulmonary effort is normal.      Breath sounds: Normal breath sounds.   Abdominal:      General: Bowel sounds are normal.      Palpations: Abdomen is soft.   Neurological:      General: No focal deficit present.      Mental Status: He is alert and oriented to person, place, and time.   Psychiatric:         Mood and Affect: Mood normal.         Thought Content: Thought content normal.                 Assessment and Plan              Assessment & Plan   Diagnoses and all orders for this visit:    1. Medicare annual wellness visit, subsequent (Primary)  Comments:  discussed need advanced care directive.  Orders:  -     Lipid Panel; Future  -     Comprehensive Metabolic Panel; Future    2. Mild intermittent asthma without complication  Comments:  stable    3. Seizures  Comments:  stable followed by neurology    4. Allergic disorder, sequela  Comments:  stable    5. Prostate cancer screening  Comments:  PSA ordered  Orders:  -     PSA SCREENING; Future        Orders Placed This Encounter   Procedures    Lipid Panel     Standing Status:   Future     Standing Expiration Date:   8/12/2025     Order Specific Question:   Release to patient     Answer:   Routine Release [[id number]]    Comprehensive Metabolic Panel     Standing Status:   Future     Standing Expiration Date:   8/12/2025     Order Specific Question:   Release to patient     Answer:   Routine Release [[id number]]    PSA SCREENING     Standing Status:   Future     Standing Expiration  Date:   8/12/2025     Order Specific Question:   Release to patient     Answer:   Routine Release [[id number]]             Follow Up   Return in about 6 months (around 2/12/2025).  Patient was given instructions and counseling regarding his condition or for health maintenance advice. Please see specific information pulled into the AVS if appropriate.    Patient Instructions   Get us copy of advanced care directive.   Monitor blood pressure.   Eat healthy exercise daily.   Follow up on lab results

## 2024-08-12 NOTE — PATIENT INSTRUCTIONS
Get us copy of advanced care directive.   Monitor blood pressure.   Eat healthy exercise daily.   Follow up on lab results

## 2024-10-01 ENCOUNTER — OFFICE VISIT (OUTPATIENT)
Dept: CARDIOLOGY | Facility: CLINIC | Age: 59
End: 2024-10-01
Payer: MEDICAID

## 2024-10-01 VITALS
HEIGHT: 68 IN | WEIGHT: 213 LBS | RESPIRATION RATE: 18 BRPM | SYSTOLIC BLOOD PRESSURE: 142 MMHG | BODY MASS INDEX: 32.28 KG/M2 | OXYGEN SATURATION: 95 % | DIASTOLIC BLOOD PRESSURE: 77 MMHG | HEART RATE: 78 BPM

## 2024-10-01 DIAGNOSIS — R00.2 PALPITATIONS: Primary | ICD-10-CM

## 2024-10-01 DIAGNOSIS — Z00.00 PREVENTATIVE HEALTH CARE: ICD-10-CM

## 2024-10-01 NOTE — PROGRESS NOTES
Cardiology Clinic Note  Jah Friedman MD, PhD    Subjective:     Encounter Date:10/01/2024      Patient ID: Faisal Cobb Jr. is a 59 y.o. male.    Chief Complaint:  Chief Complaint   Patient presents with    Follow-up       HPI:      I the pleasure to see this 59-year-old gentleman with no previous cardiac history previously complaining of some palpitations which appear stable,  He has no previous cardiac history of chest pain shortness of breath heart failure signs or symptoms angina and exerts himself on a regular basis without issue.  His EKG demonstrates sinus rhythm previously with no abnormalities, heart monitor also revealed sinus rhythm throughout the entirety of the study with no significant tacky or bradycardia arrhythmias pauses or other abnormalities.  He deferred an echo secondary to cost at that time, I did offer calcium scoring today but he appears overall to be doing very well from a cardiac standpoint.  We discussed preventative healthcare measures for which he is agreeable..  He is a non-smoker he is not diabetic.  No alcohol use blood pressure well controlled 135 systolic with heart rates in the 60s today with normal conduction.   No new sx, palpitations resolved. No new issues.      Will f/u with PCP for prevention needs.      Review of systems otherwise negative x14 point review of systems except as mentioned above     Historical data copied forward from previous encounters in EMR including the history, exam, and assessment/plan has been reviewed and is unchanged unless noted otherwise.     Cardiac medicines reviewed with risk, benefits, and necessity of each discussed.     Risk and benefit of cardiac testing reviewed including death heart attack stroke pain bleeding infection need for vascular /cardiovascular surgery were discussed and the patient      Objective:      Vitals reviewed below     Physical Exam  Regular rate and rhythm with no rubs murmurs or gallops on exam  Normal CV exam  Normal  "pulses normal cap refill  No carotid bruits or JVD  Intact grossly  Clear to auscultation  Assessment:   Doing well, no new symptoms today well compensated euvolemic no recent hospitalizations     Isolated palpitations and fluttering, resolved  Normal CV exam  Patient financial situation difficulty and wants to previously defer echo at this time, no new sx, cont to defer  Offered coronary calcium scoring    Previous labs were normal  EKG is essentially normal with normal conduction and sinus rhythm  No murmurs on exam  No previous cardiac history  No anginal chest pain heart failure signs or symptoms or dyspnea on exertion     See him back in 2 years as needed, PCP in the interim    Objective:         /77 (BP Location: Right arm, Patient Position: Sitting)   Pulse 78   Resp 18   Ht 172.7 cm (68\")   Wt 96.6 kg (213 lb)   SpO2 95%   BMI 32.39 kg/m²               The pleasure to be involved in this patient's cardiovascular care.  Please call with any questions or concerns  Jah Friedman MD, PhD    Most recent EKG as reviewed and interpreted by me:    ECG 12 Lead    Date/Time: 10/1/2024 1:05 PM  Performed by: Jah Friedman MD    Authorized by: Jah Friedman MD  Comparison: not compared with previous ECG   Previous ECG: no previous ECG available  Rhythm: sinus rhythm  Rate: normal  Conduction: incomplete right bundle branch block  QRS axis: normal    Clinical impression: non-specific ECG           Most recent echo as reviewed and interpreted by me:      Most recent stress test as reviewed and interpreted by me:      Most recent cardiac catheterization as reviewed interpreted by me:  No results found for this or any previous visit.    The following portions of the patient's history were reviewed and updated as appropriate: allergies, current medications, past family history, past medical history, past social history, past surgical history, and problem list.      ROS:  14 point review of " systems negative except as mentioned above    Current Outpatient Medications:     albuterol sulfate  (90 Base) MCG/ACT inhaler, Inhale 2 puffs Every 4 (Four) Hours As Needed for Wheezing., Disp: 18 g, Rfl: 5    loratadine (Claritin) 10 MG tablet, Take 1 tablet by mouth Daily., Disp: 90 tablet, Rfl: 1    montelukast (SINGULAIR) 10 MG tablet, Take 1 tablet by mouth every night at bedtime., Disp: 90 tablet, Rfl: 1    multivitamin with minerals tablet tablet, Take 1 tablet by mouth Daily., Disp: , Rfl:     phenytoin (DILANTIN) 100 MG ER capsule, Take 3 capsules by mouth Every Night., Disp: , Rfl:     Problem List:  Patient Active Problem List   Diagnosis    Mild intermittent asthma without complication    Allergic    Seizures    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     Social History:  Social History     Socioeconomic History    Marital status:    Tobacco Use    Smoking status: Never    Smokeless tobacco: Never   Vaping Use    Vaping status: Never Used   Substance and Sexual Activity    Alcohol use: Never    Drug use: Never    Sexual activity: Defer     Allergies:  No Known Allergies  Immunizations:  Immunization History   Administered Date(s) Administered    Flu Vaccine Intradermal Quad 18-64YR 09/24/2012, 10/19/2013, 11/04/2014    Flu Vaccine Quad PF 6-35MO 11/30/2016, 11/07/2017, 12/04/2018, 09/11/2019    Fluzone  >6mos 12/06/2015    Fluzone (or Fluarix & Flulaval for VFC) >6mos 10/14/2020    Pneumococcal Conjugate 20-Valent (PCV20)  11/22/2022    Pneumococcal Polysaccharide (PPSV23) 10/26/2021    Tdap 03/30/2018            In-Office Procedure(s):  No orders to display        ASCVD RIsk Score::  The 10-year ASCVD risk score (Michael BAXTER, et al., 2019) is: 9.2%    Values used to calculate the score:      Age: 59 years      Sex: Male      Is Non- : No      Diabetic: No      Tobacco smoker: No      Systolic Blood Pressure: 142 mmHg      Is BP treated: No      HDL Cholesterol: 55 mg/dL      Total Cholesterol: 190 mg/dL    Imaging:                 Lab Review:   Lab on 08/12/2024   Component Date Value    Total Cholesterol 08/12/2024 190     Triglycerides 08/12/2024 218 (H)     HDL Cholesterol 08/12/2024 55     LDL Cholesterol  08/12/2024 98     VLDL Cholesterol 08/12/2024 37     LDL/HDL Ratio 08/12/2024 1.66     Glucose 08/12/2024 87     BUN 08/12/2024 10     Creatinine 08/12/2024 1.04     Sodium 08/12/2024 137     Potassium 08/12/2024 4.1     Chloride 08/12/2024 103     CO2 08/12/2024 25.0     Calcium 08/12/2024 9.1     Total Protein 08/12/2024 7.0     Albumin 08/12/2024 4.3     ALT (SGPT) 08/12/2024 17     AST (SGOT) 08/12/2024 18     Alkaline Phosphatase 08/12/2024 115     Total Bilirubin 08/12/2024 0.3     Globulin 08/12/2024 2.7     A/G Ratio 08/12/2024 1.6     BUN/Creatinine Ratio 08/12/2024 9.6     Anion Gap 08/12/2024 9.0     eGFR 08/12/2024 82.2     PSA 08/12/2024 1.200      Recent labs reviewed and interpreted for clinical significance and application            Level of Care:           Jah Friedman MD  10/01/24  .

## 2025-02-11 RX ORDER — MONTELUKAST SODIUM 10 MG/1
10 TABLET ORAL
Qty: 30 TABLET | Refills: 0 | Status: SHIPPED | OUTPATIENT
Start: 2025-02-11

## 2025-02-11 RX ORDER — LORATADINE 10 MG/1
10 TABLET ORAL DAILY
Qty: 30 TABLET | Refills: 0 | Status: SHIPPED | OUTPATIENT
Start: 2025-02-11

## 2025-02-13 ENCOUNTER — OFFICE VISIT (OUTPATIENT)
Dept: FAMILY MEDICINE CLINIC | Facility: CLINIC | Age: 60
End: 2025-02-13
Payer: MEDICARE

## 2025-02-13 VITALS
BODY MASS INDEX: 32.67 KG/M2 | HEIGHT: 68 IN | WEIGHT: 215.6 LBS | SYSTOLIC BLOOD PRESSURE: 143 MMHG | OXYGEN SATURATION: 95 % | TEMPERATURE: 98.6 F | HEART RATE: 90 BPM | RESPIRATION RATE: 18 BRPM | DIASTOLIC BLOOD PRESSURE: 89 MMHG

## 2025-02-13 DIAGNOSIS — I49.8 FLUTTERING HEART: Primary | ICD-10-CM

## 2025-02-13 DIAGNOSIS — E66.09 CLASS 1 OBESITY DUE TO EXCESS CALORIES WITHOUT SERIOUS COMORBIDITY WITH BODY MASS INDEX (BMI) OF 32.0 TO 32.9 IN ADULT: Chronic | ICD-10-CM

## 2025-02-13 DIAGNOSIS — J45.20 MILD INTERMITTENT ASTHMA WITHOUT COMPLICATION: Chronic | ICD-10-CM

## 2025-02-13 DIAGNOSIS — T78.40XS ALLERGIC DISORDER, SEQUELA: Chronic | ICD-10-CM

## 2025-02-13 DIAGNOSIS — E66.811 CLASS 1 OBESITY DUE TO EXCESS CALORIES WITHOUT SERIOUS COMORBIDITY WITH BODY MASS INDEX (BMI) OF 32.0 TO 32.9 IN ADULT: Chronic | ICD-10-CM

## 2025-02-13 DIAGNOSIS — R56.9 SEIZURES: Chronic | ICD-10-CM

## 2025-02-13 PROCEDURE — 99214 OFFICE O/P EST MOD 30 MIN: CPT | Performed by: NURSE PRACTITIONER

## 2025-02-13 PROCEDURE — 1159F MED LIST DOCD IN RCRD: CPT | Performed by: NURSE PRACTITIONER

## 2025-02-13 PROCEDURE — 1160F RVW MEDS BY RX/DR IN RCRD: CPT | Performed by: NURSE PRACTITIONER

## 2025-02-13 PROCEDURE — 1126F AMNT PAIN NOTED NONE PRSNT: CPT | Performed by: NURSE PRACTITIONER

## 2025-02-13 NOTE — PROGRESS NOTES
"Subjective        Faisal Cobb Jr. is a 59 y.o. male.     Chief Complaint   Patient presents with    Asthma     6 month fl/u       Asthma  His past medical history is significant for asthma.     Patient is here for management of his chronic medical problems:   Seizure disorder, mild asthma, allergies.     Allergies: taking singulair 10 mg daily loratadine 10 mg daily.     Mild asthma using albuterol as needed. Taking allergy medications daily.     Seizure disorder: followed by neurlogy taking dilantin 100mg  mg at night.     Heart palpitation: he was evaluated by cardiology 10/2024 no findings. He completed echo and cardiac calcium scoring. Will fu 2 years.     Hyperlipidemia triglycerides 218 high 8/12/2024.         The following portions of the patient's history were reviewed and updated as appropriate: allergies, current medications, past family history, past medical history, past social history, past surgical history and problem list.      Current Outpatient Medications:     albuterol sulfate  (90 Base) MCG/ACT inhaler, Inhale 2 puffs Every 4 (Four) Hours As Needed for Wheezing., Disp: 18 g, Rfl: 5    loratadine (CLARITIN) 10 MG tablet, TAKE 1 TABLET BY MOUTH DAILY, Disp: 30 tablet, Rfl: 0    montelukast (SINGULAIR) 10 MG tablet, TAKE 1 TABLET BY MOUTH EVERY NIGHT AT BEDTIME, Disp: 30 tablet, Rfl: 0    multivitamin with minerals tablet tablet, Take 1 tablet by mouth Daily., Disp: , Rfl:     phenytoin (DILANTIN) 100 MG ER capsule, Take 3 capsules by mouth Every Night., Disp: , Rfl:     No results found for this or any previous visit (from the past 24 weeks).      Review of Systems    Objective     /89 (BP Location: Left arm, Patient Position: Sitting, Cuff Size: Adult)   Pulse 90   Temp 98.6 °F (37 °C) (Oral)   Resp 18   Ht 172.7 cm (67.99\")   Wt 97.8 kg (215 lb 9.6 oz)   SpO2 95%   BMI 32.79 kg/m²     Physical Exam  Vitals and nursing note reviewed.   Constitutional:       Appearance: " Normal appearance.   HENT:      Head: Normocephalic.      Right Ear: Tympanic membrane normal.      Left Ear: Tympanic membrane normal.      Nose: Nose normal.      Mouth/Throat:      Mouth: Mucous membranes are moist.   Eyes:      Pupils: Pupils are equal, round, and reactive to light.   Cardiovascular:      Rate and Rhythm: Normal rate and regular rhythm.      Pulses: Normal pulses.      Heart sounds: Normal heart sounds.   Pulmonary:      Effort: Pulmonary effort is normal.      Breath sounds: Normal breath sounds.   Abdominal:      General: Bowel sounds are normal.      Palpations: Abdomen is soft.   Musculoskeletal:         General: Normal range of motion.      Cervical back: Normal range of motion.   Skin:     General: Skin is warm.   Neurological:      General: No focal deficit present.      Mental Status: He is alert. Mental status is at baseline.   Psychiatric:         Mood and Affect: Mood normal.         Result Review :                Assessment & Plan    Diagnoses and all orders for this visit:    1. Fluttering heart (Primary)  Comments:  stable no acute findings from cardiology with evaluation.    2. Allergic disorder, sequela  Comments:  stable    3. Seizures  Comments:  stable    4. Mild intermittent asthma without complication  Comments:  has albuterol inhaler as needed    5. Class 1 obesity due to excess calories without serious comorbidity with body mass index (BMI) of 32.0 to 32.9 in adult  Comments:  recomened he eat healthy and exercise daily/  Assessment & Plan:  Patient's (Body mass index is 32.79 kg/m².) indicates that they are obese (BMI >30) with health conditions that include  seizure disorder  . Weight is unchanged. BMI  is above average; BMI management plan is completed. We discussed portion control and increasing exercise.         Patient Instructions   Eat healthy and exercise daily.  Use inhaler as discussed.       Follow Up   Return in about 6 months (around 8/13/2025).    Patient  was given instructions and counseling regarding his condition or for health maintenance advice. Please see specific information pulled into the AVS if appropriate.     Snow Coelho, APRN    02/13/25

## 2025-02-13 NOTE — ASSESSMENT & PLAN NOTE
Patient's (Body mass index is 32.79 kg/m².) indicates that they are obese (BMI >30) with health conditions that include  seizure disorder  . Weight is unchanged. BMI  is above average; BMI management plan is completed. We discussed portion control and increasing exercise.

## 2025-03-11 ENCOUNTER — HOSPITAL ENCOUNTER (OUTPATIENT)
Dept: GENERAL RADIOLOGY | Facility: HOSPITAL | Age: 60
Discharge: HOME | End: 2025-03-11
Admitting: NURSE PRACTITIONER
Payer: MEDICARE

## 2025-03-11 ENCOUNTER — OFFICE VISIT (OUTPATIENT)
Dept: FAMILY MEDICINE CLINIC | Facility: CLINIC | Age: 60
End: 2025-03-11
Payer: MEDICARE

## 2025-03-11 VITALS
DIASTOLIC BLOOD PRESSURE: 89 MMHG | RESPIRATION RATE: 16 BRPM | WEIGHT: 211 LBS | BODY MASS INDEX: 31.98 KG/M2 | HEART RATE: 81 BPM | HEIGHT: 68 IN | OXYGEN SATURATION: 95 % | SYSTOLIC BLOOD PRESSURE: 129 MMHG | TEMPERATURE: 98.4 F

## 2025-03-11 DIAGNOSIS — M25.561 ACUTE PAIN OF RIGHT KNEE: Primary | ICD-10-CM

## 2025-03-11 DIAGNOSIS — M25.561 ACUTE PAIN OF RIGHT KNEE: ICD-10-CM

## 2025-03-11 PROCEDURE — 1159F MED LIST DOCD IN RCRD: CPT | Performed by: NURSE PRACTITIONER

## 2025-03-11 PROCEDURE — 73562 X-RAY EXAM OF KNEE 3: CPT

## 2025-03-11 PROCEDURE — 1160F RVW MEDS BY RX/DR IN RCRD: CPT | Performed by: NURSE PRACTITIONER

## 2025-03-11 PROCEDURE — 1125F AMNT PAIN NOTED PAIN PRSNT: CPT | Performed by: NURSE PRACTITIONER

## 2025-03-11 PROCEDURE — 99213 OFFICE O/P EST LOW 20 MIN: CPT | Performed by: NURSE PRACTITIONER

## 2025-03-11 NOTE — PATIENT INSTRUCTIONS
Call office for xray results if you have not received a call from our office or Yamsafer message in 1-2 days.    Can apply ice daily.   Continue current medications and treatment.   Can take otc ibuprofen 600mg every 6-8 hours.  Can also take otc tylenol as needed per package instructions.  Ok to give work note for today.
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
## 2025-03-17 ENCOUNTER — RESULTS FOLLOW-UP (OUTPATIENT)
Dept: GENERAL RADIOLOGY | Facility: HOSPITAL | Age: 60
End: 2025-03-17
Payer: MEDICARE

## 2025-03-17 NOTE — LETTER
Faisal Cobb Jr.  [address] [facility] [place] [address]    March 17, 2025     Dear Mr. Cobb:    Below are the results from your recent visit:    Resulted Orders   XR Knee 3 View Right    Narrative    XR KNEE 3 VW RIGHT    Date of Exam: 3/11/2025 1:45 PM EDT    Indication: right knee pain    Comparison: None available.    Findings:  There is a questionable small suprapatellar effusion which is not well evaluated given oblique positioning of the knee on the crosstable lateral view. Bones are anatomically aligned. No evidence of fracture or degenerative change.      Impression    Impression:  1.Questionable suprapatellar effusion, which is not well evaluated due to nonstandard positioning.  2.Otherwise normal exam.      Electronically Signed: Soren Kirkland MD    3/14/2025 5:08 PM EDT    Workstation ID: [id number]       The x-ray of your right knee does not show any evidence of a fracture.  However, does question some fluid buildup in the part of your knee that was tender on exam.  If pain has not improved or swelling has worsened, please contact clinic and we can place a referral for you to see an orthopedic specialist.    Please continue plan of care and follow-up with NELLIE Storm as previously advised.  If you have any questions or concerns, please don't hesitate to call.         Sincerely,        NELLIE Deluca

## 2025-03-17 NOTE — TELEPHONE ENCOUNTER
----- Message from Hamida Griggs sent at 3/17/2025  2:04 PM EDT -----  Please contact patient and read him the letter I generated for him.  Thank you.    ----- Message -----  From: Sandra Rad Results Teller In  Sent: 3/14/2025   5:10 PM EDT  To: NELLIE Sifuentes

## 2025-03-21 RX ORDER — MONTELUKAST SODIUM 10 MG/1
10 TABLET ORAL
Qty: 90 TABLET | Refills: 0 | Status: SHIPPED | OUTPATIENT
Start: 2025-03-21

## 2025-05-07 RX ORDER — ALBUTEROL SULFATE 90 UG/1
2 INHALANT RESPIRATORY (INHALATION) EVERY 4 HOURS PRN
Qty: 18 G | Refills: 5 | Status: SHIPPED | OUTPATIENT
Start: 2025-05-07

## 2025-07-25 ENCOUNTER — OFFICE VISIT (OUTPATIENT)
Dept: FAMILY MEDICINE CLINIC | Facility: CLINIC | Age: 60
End: 2025-07-25
Payer: MEDICARE

## 2025-07-25 VITALS
OXYGEN SATURATION: 97 % | SYSTOLIC BLOOD PRESSURE: 102 MMHG | DIASTOLIC BLOOD PRESSURE: 71 MMHG | RESPIRATION RATE: 17 BRPM | WEIGHT: 191 LBS | TEMPERATURE: 97.8 F | HEART RATE: 89 BPM | BODY MASS INDEX: 28.95 KG/M2 | HEIGHT: 68 IN

## 2025-07-25 DIAGNOSIS — L23.7 POISON IVY DERMATITIS: Primary | ICD-10-CM

## 2025-07-25 RX ORDER — PREDNISONE 10 MG/1
TABLET ORAL
Qty: 30 TABLET | Refills: 0 | Status: SHIPPED | OUTPATIENT
Start: 2025-07-25 | End: 2025-08-06

## 2025-07-25 RX ORDER — HYDROCORTISONE 25 MG/G
1 CREAM TOPICAL 2 TIMES DAILY
Qty: 28 G | Refills: 1 | Status: SHIPPED | OUTPATIENT
Start: 2025-07-25

## 2025-07-25 NOTE — PROGRESS NOTES
"Subjective        Faisal Cobb Jr. is a 60 y.o. male.     Chief Complaint   Patient presents with    Poison Rosita     Started 3 days ago. Leg, hip and elbow. Itching       Poison Ivy    Patient is here for poison ivy on his legs he was working on cutting down bushes. He picked up a lot of branches and stuff. It itches a lot . He is using calamine lotion he said he also has over the counter hydrocortisone cream.           The following portions of the patient's history were reviewed and updated as appropriate: allergies, current medications, past family history, past medical history, past social history, past surgical history and problem list.      Current Outpatient Medications:     albuterol sulfate  (90 Base) MCG/ACT inhaler, INHALE 2 PUFFS BY MOUTH EVERY 4 HOURS AS NEEDED FOR WHEEZING, Disp: 18 g, Rfl: 5    loratadine (CLARITIN) 10 MG tablet, TAKE 1 TABLET BY MOUTH DAILY, Disp: 30 tablet, Rfl: 0    montelukast (SINGULAIR) 10 MG tablet, TAKE 1 TABLET BY MOUTH EVERY NIGHT AT BEDTIME, Disp: 90 tablet, Rfl: 0    multivitamin with minerals tablet tablet, Take 1 tablet by mouth Daily., Disp: , Rfl:     phenytoin (DILANTIN) 100 MG ER capsule, Take 3 capsules by mouth Every Night., Disp: , Rfl:     hydrocortisone 2.5 % cream, Apply 1 Application topically to the appropriate area as directed 2 (Two) Times a Day., Disp: 28 g, Rfl: 1    predniSONE (DELTASONE) 10 MG tablet, Take 4 tablets by mouth Daily for 3 days, THEN 3 tablets Daily for 3 days, THEN 2 tablets Daily for 3 days, THEN 1 tablet Daily for 3 days., Disp: 30 tablet, Rfl: 0    No results found for this or any previous visit (from the past 24 weeks).      Review of Systems    Objective     /71 (BP Location: Left arm, Patient Position: Sitting, Cuff Size: Adult)   Pulse 89   Temp 97.8 °F (36.6 °C) (Oral)   Resp 17   Ht 172.7 cm (67.99\")   Wt 86.6 kg (191 lb)   SpO2 97%   BMI 29.05 kg/m²     Physical Exam  Vitals and nursing note reviewed. "   Constitutional:       Appearance: Normal appearance.   HENT:      Head: Normocephalic.      Right Ear: Tympanic membrane normal.      Left Ear: Tympanic membrane normal.      Nose: Nose normal.   Eyes:      Pupils: Pupils are equal, round, and reactive to light.   Cardiovascular:      Rate and Rhythm: Normal rate and regular rhythm.      Pulses: Normal pulses.      Heart sounds: Normal heart sounds.   Abdominal:      Palpations: Abdomen is soft.   Skin:     Findings: Rash present. Rash is vesicular.          Neurological:      Mental Status: He is alert.   Psychiatric:         Mood and Affect: Mood normal.         Thought Content: Thought content normal.         Result Review :                Assessment & Plan    Diagnoses and all orders for this visit:    1. Poison ivy dermatitis (Primary)  Comments:  steroids and hydrocortisone cream    Other orders  -     predniSONE (DELTASONE) 10 MG tablet; Take 4 tablets by mouth Daily for 3 days, THEN 3 tablets Daily for 3 days, THEN 2 tablets Daily for 3 days, THEN 1 tablet Daily for 3 days.  Dispense: 30 tablet; Refill: 0  -     hydrocortisone 2.5 % cream; Apply 1 Application topically to the appropriate area as directed 2 (Two) Times a Day.  Dispense: 28 g; Refill: 1      Patient Instructions   Take the steroids in the Am with food  Stay hydrated  Use the hydrocortisone cream as prescribed.     Follow Up   Return if symptoms worsen or fail to improve.    Patient was given instructions and counseling regarding his condition or for health maintenance advice. Please see specific information pulled into the AVS if appropriate.     Snow Coelho, APREMILIA    07/25/25

## 2025-07-25 NOTE — PATIENT INSTRUCTIONS
Take the steroids in the Am with food  Stay hydrated  Use the hydrocortisone cream as prescribed.

## 2025-07-28 RX ORDER — MONTELUKAST SODIUM 10 MG/1
10 TABLET ORAL
Qty: 90 TABLET | Refills: 0 | Status: SHIPPED | OUTPATIENT
Start: 2025-07-28

## 2025-07-30 NOTE — TELEPHONE ENCOUNTER
Caller: MARQUITA    Relationship: SELF    Best call back number: [phone]    Requested Prescriptions:   Requested Prescriptions     Pending Prescriptions Disp Refills    loratadine (CLARITIN) 10 MG tablet 30 tablet 0     Sig: Take 1 tablet by mouth Daily.    hydrocortisone 2.5 % cream 28 g 1     Sig: Apply 1 Application topically to the appropriate area as directed 2 (Two) Times a Day.    albuterol sulfate  (90 Base) MCG/ACT inhaler     Pharmacy where request should be sent: [id number] - [facility] [place] - [address] AT [address] [facility] [phone] [facility] [phone] FX     Last office visit with prescribing clinician: 7/25/2025   Last telemedicine visit with prescribing clinician: Visit date not found   Next office visit with prescribing clinician: 8/14/2025     Additional details provided by patient: PATIENT STATED HE LOST HIS MEDICATION AND IS NEEDING REFILLS    Does the patient have less than a 3 day supply:  [x] Yes  [] No    Would you like a call back once the refill request has been completed: [] Yes [x] No    If the office needs to give you a call back, can they leave a voicemail: [] Yes [x] No    Garland Norton Rep   07/30/25 13:58 EDT

## 2025-07-31 RX ORDER — LORATADINE 10 MG/1
10 TABLET ORAL DAILY
Qty: 90 TABLET | Refills: 0 | Status: SHIPPED | OUTPATIENT
Start: 2025-07-31

## 2025-07-31 RX ORDER — HYDROCORTISONE 25 MG/G
1 CREAM TOPICAL 2 TIMES DAILY
Qty: 28 G | Refills: 1 | Status: SHIPPED | OUTPATIENT
Start: 2025-07-31

## 2025-08-14 ENCOUNTER — LAB (OUTPATIENT)
Dept: LAB | Facility: HOSPITAL | Age: 60
End: 2025-08-14
Payer: MEDICARE

## 2025-08-14 ENCOUNTER — OFFICE VISIT (OUTPATIENT)
Dept: FAMILY MEDICINE CLINIC | Facility: CLINIC | Age: 60
End: 2025-08-14
Payer: MEDICARE

## 2025-08-14 VITALS
SYSTOLIC BLOOD PRESSURE: 111 MMHG | BODY MASS INDEX: 28.19 KG/M2 | HEIGHT: 68 IN | OXYGEN SATURATION: 98 % | RESPIRATION RATE: 17 BRPM | WEIGHT: 186 LBS | DIASTOLIC BLOOD PRESSURE: 85 MMHG | TEMPERATURE: 97.5 F | HEART RATE: 77 BPM

## 2025-08-14 DIAGNOSIS — Z00.00 MEDICARE ANNUAL WELLNESS VISIT, SUBSEQUENT: ICD-10-CM

## 2025-08-14 DIAGNOSIS — R74.8 ELEVATED ALKALINE PHOSPHATASE LEVEL: ICD-10-CM

## 2025-08-14 DIAGNOSIS — Z00.00 MEDICARE ANNUAL WELLNESS VISIT, SUBSEQUENT: Primary | ICD-10-CM

## 2025-08-14 DIAGNOSIS — R74.8 ELEVATED ALKALINE PHOSPHATASE LEVEL: Primary | ICD-10-CM

## 2025-08-14 DIAGNOSIS — J45.20 MILD INTERMITTENT ASTHMA WITHOUT COMPLICATION: Chronic | ICD-10-CM

## 2025-08-14 DIAGNOSIS — R74.8 ABNORMAL GGT TEST: ICD-10-CM

## 2025-08-14 DIAGNOSIS — Z12.5 PROSTATE CANCER SCREENING: Chronic | ICD-10-CM

## 2025-08-14 DIAGNOSIS — R56.9 SEIZURES: ICD-10-CM

## 2025-08-14 LAB
ALBUMIN SERPL-MCNC: 3.8 G/DL (ref 3.5–5.2)
ALBUMIN/GLOB SERPL: 1 G/DL
ALP SERPL-CCNC: 318 U/L (ref 39–117)
ALT SERPL W P-5'-P-CCNC: 44 U/L (ref 1–41)
ANION GAP SERPL CALCULATED.3IONS-SCNC: 12.3 MMOL/L (ref 5–15)
AST SERPL-CCNC: 61 U/L (ref 1–40)
BILIRUB SERPL-MCNC: 0.5 MG/DL (ref 0–1.2)
BUN SERPL-MCNC: 20 MG/DL (ref 8–23)
BUN/CREAT SERPL: 19 (ref 7–25)
CALCIUM SPEC-SCNC: 9.9 MG/DL (ref 8.6–10.5)
CHLORIDE SERPL-SCNC: 99 MMOL/L (ref 98–107)
CHOLEST SERPL-MCNC: 209 MG/DL (ref 0–200)
CO2 SERPL-SCNC: 24.7 MMOL/L (ref 22–29)
CREAT SERPL-MCNC: 1.05 MG/DL (ref 0.76–1.27)
EGFRCR SERPLBLD CKD-EPI 2021: 80.8 ML/MIN/1.73
GGT SERPL-CCNC: 573 U/L (ref 8–61)
GLOBULIN UR ELPH-MCNC: 3.8 GM/DL
GLUCOSE SERPL-MCNC: 85 MG/DL (ref 65–99)
HDLC SERPL-MCNC: 46 MG/DL (ref 40–60)
LDLC SERPL CALC-MCNC: 146 MG/DL (ref 0–100)
LDLC/HDLC SERPL: 3.13 {RATIO}
PHENYTOIN SERPL-MCNC: 12.8 MCG/ML (ref 10–20)
POTASSIUM SERPL-SCNC: 4.5 MMOL/L (ref 3.5–5.2)
PROT SERPL-MCNC: 7.6 G/DL (ref 6–8.5)
PSA SERPL-MCNC: 1.19 NG/ML (ref 0–4)
SODIUM SERPL-SCNC: 136 MMOL/L (ref 136–145)
TRIGL SERPL-MCNC: 95 MG/DL (ref 0–150)
VLDLC SERPL-MCNC: 17 MG/DL (ref 5–40)

## 2025-08-14 PROCEDURE — 80185 ASSAY OF PHENYTOIN TOTAL: CPT

## 2025-08-14 PROCEDURE — 80061 LIPID PANEL: CPT

## 2025-08-14 PROCEDURE — 1160F RVW MEDS BY RX/DR IN RCRD: CPT | Performed by: NURSE PRACTITIONER

## 2025-08-14 PROCEDURE — 36415 COLL VENOUS BLD VENIPUNCTURE: CPT

## 2025-08-14 PROCEDURE — 82977 ASSAY OF GGT: CPT

## 2025-08-14 PROCEDURE — 1126F AMNT PAIN NOTED NONE PRSNT: CPT | Performed by: NURSE PRACTITIONER

## 2025-08-14 PROCEDURE — G0103 PSA SCREENING: HCPCS

## 2025-08-14 PROCEDURE — 1159F MED LIST DOCD IN RCRD: CPT | Performed by: NURSE PRACTITIONER

## 2025-08-14 PROCEDURE — 80053 COMPREHEN METABOLIC PANEL: CPT

## 2025-08-14 PROCEDURE — 1170F FXNL STATUS ASSESSED: CPT | Performed by: NURSE PRACTITIONER

## 2025-08-21 ENCOUNTER — TELEPHONE (OUTPATIENT)
Dept: FAMILY MEDICINE CLINIC | Facility: CLINIC | Age: 60
End: 2025-08-21
Payer: MEDICARE

## 2025-08-28 ENCOUNTER — HOSPITAL ENCOUNTER (OUTPATIENT)
Dept: ULTRASOUND IMAGING | Facility: HOSPITAL | Age: 60
Discharge: HOME | End: 2025-08-28
Admitting: NURSE PRACTITIONER
Payer: MEDICARE

## 2025-08-28 ENCOUNTER — APPOINTMENT (OUTPATIENT)
Dept: LAB | Facility: HOSPITAL | Age: 60
End: 2025-08-28
Payer: MEDICARE

## 2025-08-28 ENCOUNTER — TELEPHONE (OUTPATIENT)
Dept: FAMILY MEDICINE CLINIC | Facility: CLINIC | Age: 60
End: 2025-08-28
Payer: MEDICARE

## 2025-08-28 DIAGNOSIS — R74.8 ABNORMAL GGT TEST: ICD-10-CM

## 2025-08-28 DIAGNOSIS — R93.2 ABNORMAL LIVER ULTRASOUND: Primary | ICD-10-CM

## 2025-08-28 DIAGNOSIS — R74.8 ELEVATED ALKALINE PHOSPHATASE LEVEL: ICD-10-CM

## 2025-08-28 PROCEDURE — 76705 ECHO EXAM OF ABDOMEN: CPT
